# Patient Record
Sex: MALE | Race: WHITE | NOT HISPANIC OR LATINO | Employment: UNEMPLOYED | URBAN - METROPOLITAN AREA
[De-identification: names, ages, dates, MRNs, and addresses within clinical notes are randomized per-mention and may not be internally consistent; named-entity substitution may affect disease eponyms.]

---

## 2017-01-05 ENCOUNTER — HOSPITAL ENCOUNTER (EMERGENCY)
Facility: MEDICAL CENTER | Age: 27
End: 2017-01-06
Attending: EMERGENCY MEDICINE
Payer: COMMERCIAL

## 2017-01-05 VITALS
HEART RATE: 97 BPM | OXYGEN SATURATION: 98 % | DIASTOLIC BLOOD PRESSURE: 89 MMHG | SYSTOLIC BLOOD PRESSURE: 124 MMHG | TEMPERATURE: 97.2 F | WEIGHT: 175 LBS | RESPIRATION RATE: 16 BRPM | BODY MASS INDEX: 26.52 KG/M2 | HEIGHT: 68 IN

## 2017-01-05 DIAGNOSIS — F90.8 ATTENTION-DEFICIT HYPERACTIVITY DISORDER, OTHER TYPE: ICD-10-CM

## 2017-01-05 DIAGNOSIS — F43.21 SITUATIONAL DEPRESSION: ICD-10-CM

## 2017-01-05 LAB
AMPHET UR QL SCN: NEGATIVE
BARBITURATES UR QL SCN: NEGATIVE
BENZODIAZ UR QL SCN: NEGATIVE
BZE UR QL SCN: NEGATIVE
CANNABINOIDS UR QL SCN: POSITIVE
MDMA UR QL SCN: NEGATIVE
METHADONE UR QL SCN: NEGATIVE
OPIATES UR QL SCN: NEGATIVE
OXYCODONE UR QL SCN: NEGATIVE
PCP UR QL SCN: NEGATIVE
POC BREATHALIZER: 0 PERCENT (ref 0–0.01)
PROPOXYPH UR QL SCN: NEGATIVE

## 2017-01-05 PROCEDURE — 90791 PSYCH DIAGNOSTIC EVALUATION: CPT

## 2017-01-05 PROCEDURE — 302970 POC BREATHALIZER: Performed by: EMERGENCY MEDICINE

## 2017-01-05 PROCEDURE — 80307 DRUG TEST PRSMV CHEM ANLYZR: CPT

## 2017-01-05 PROCEDURE — 99285 EMERGENCY DEPT VISIT HI MDM: CPT

## 2017-01-05 RX ORDER — LAMOTRIGINE 100 MG/1
100 TABLET ORAL DAILY
Status: SHIPPED | COMMUNITY
End: 2022-02-20

## 2017-01-05 RX ORDER — DEXTROAMPHETAMINE SACCHARATE, AMPHETAMINE ASPARTATE MONOHYDRATE, DEXTROAMPHETAMINE SULFATE AND AMPHETAMINE SULFATE 2.5; 2.5; 2.5; 2.5 MG/1; MG/1; MG/1; MG/1
10 CAPSULE, EXTENDED RELEASE ORAL EVERY MORNING
Status: SHIPPED | COMMUNITY
End: 2022-02-20

## 2017-01-05 RX ORDER — PHENAZOPYRIDINE HYDROCHLORIDE 100 MG/1
100 TABLET, FILM COATED ORAL 3 TIMES DAILY PRN
Status: SHIPPED | COMMUNITY
End: 2022-02-20

## 2017-01-05 RX ORDER — QUETIAPINE FUMARATE 100 MG/1
100 TABLET, FILM COATED ORAL 2 TIMES DAILY
Status: SHIPPED | COMMUNITY
End: 2022-02-20

## 2017-01-05 ASSESSMENT — PAIN SCALES - GENERAL: PAINLEVEL_OUTOF10: 0

## 2017-01-05 NOTE — ED AVS SNAPSHOT
Nanigans Access Code: [email or web address]  Expires: 2/5/2017 12:13 AM    Your email address is not on file at [facility].  Email Addresses are required for you to sign up for Nanigans, please contact [phone] to verify your personal information and to provide your email address prior to attempting to register for Nanigans.    Bry Fuenteseto  [address] [place] [address]    Nanigans  A secure, online tool to manage your health information     [facility]’s Nanigans® is a secure, online tool that connects you to your personalized health information from the privacy of your home -- day or night - making it very easy for you to manage your healthcare. Once the activation process is completed, you can even access your medical information using the Nanigans edgardo, which is available for free in the Apple Edgardo store or Google Play store.     To learn more about Nanigans, visit [email or web address]    There are two levels of access available (as shown below):   My Chart Features  [facility] Primary Care Doctor [facility]  Specialists [facility]  Urgent  Care Non-[facility] Primary Care Doctor   Email your healthcare team securely and privately 24/7 X X X    Manage appointments: schedule your next appointment; view details of past/upcoming appointments X      Request prescription refills. X      View recent personal medical records, including lab and immunizations X X X X   View health record, including health history, allergies, medications X X X X   Read reports about your outpatient visits, procedures, consult and ER notes X X X X   See your discharge summary, which is a recap of your hospital and/or ER visit that includes your diagnosis, lab results, and care plan X X  X     How to register for [employer]:  Once your e-mail address has been verified, follow the following steps to sign up for Nanigans.     1. Go to  [email or web address]  2. Click on the Sign Up Now box, which takes you to the New Member Sign Up page. You will need  to provide the following information:  a. Enter your Coupons Near Me Access Code exactly as it appears at the top of this page. (You will not need to use this code after you’ve completed the sign-up process. If you do not sign up before the expiration date, you must request a new code.)   b. Enter your date of birth.   c. Enter your home email address.   d. Click Submit, and follow the next screen’s instructions.  3. Create a Coupons Near Me ID. This will be your Coupons Near Me login ID and cannot be changed, so think of one that is secure and easy to remember.  4. Create a Coupons Near Me password. You can change your password at any time.  5. Enter your Password Reset Question and Answer. This can be used at a later time if you forget your password.   6. Enter your e-mail address. This allows you to receive e-mail notifications when new information is available in Coupons Near Me.  7. Click Sign Up. You can now view your health information.    For assistance activating your Coupons Near Me account, call [phone]

## 2017-01-05 NOTE — ED AVS SNAPSHOT
After Visit Summary                                                                                                                Bry Knowles   MRN: [id number]    Department:  [facility], Emergency Dept   Date of Visit:  1/5/2017            [facility], Emergency Dept    [address]    [place] [address]    Phone:  [phone]      You were seen by     Agus Araya D.O.      Your Diagnosis Was     Situational depression     F43.21       Follow-up Information     1. Please follow up.    Why:  Follow up with your physician      Medication Information     Review all of your home medications and newly ordered medications with your primary doctor and/or pharmacist as soon as possible. Follow medication instructions as directed by your doctor and/or pharmacist.     Please keep your complete medication list with you and share with your physician. Update the information when medications are discontinued, doses are changed, or new medications (including over-the-counter products) are added; and carry medication information at all times in the event of emergency situations.               Medication List      ASK your doctor about these medications        Instructions    amphetamine-dextroamphetamine XR 10 MG Cp24   Commonly known as:  ADDERALL XR    Take 10 mg by mouth every morning.   Dose:  10 mg       lamotrigine 100 MG Tabs   Commonly known as:  LAMICTAL    Take 100 mg by mouth every day.   Dose:  100 mg       * phenazopyridine 100 MG Tabs   Commonly known as:  PYRIDIUM    Take 100 mg by mouth 3 times a day as needed.   Dose:  100 mg       * phenazopyridine 200 MG Tabs   Commonly known as:  PYRIDIUM    Take 1 Tab by mouth 3 times a day as needed for Mild Pain.   Dose:  200 mg       quetiapine 100 MG Tabs   Commonly known as:  SEROQUEL    Take 100 mg by mouth 2 times a day.   Dose:  100 mg       * Notice:  This list has 2 medication(s) that are the same as other  medications prescribed for you. Read the directions carefully, and ask your doctor or other care provider to review them with you.            Procedures and tests performed during your visit     POC BREATHALIZER    URINE DRUG SCREEN        Discharge Instructions       Adjustment Disorder  Adjustment disorder is an unusually severe reaction to a stressful life event, such as the loss of a job or physical illness. The event may be any stressful event other than the loss of a loved one. Adjustment disorder may affect your feelings, your thinking, how you act, or a combination of these. It may interfere with personal relationships or with the way you are at work, school, or home. People with this disorder are at risk for suicide and substance abuse. They may develop a more serious mental disorder, such as major depressive disorder or post-traumatic stress disorder.  SIGNS AND SYMPTOMS   Symptoms may include:  · Sadness, depressed mood, or crying spells.  · Loss of enjoyment.  · Change in appetite or weight.  · Sense of loss or hopelessness.  · Thoughts of suicide.  · Anxiety, worry, or nervousness.  · Trouble sleeping.  · Avoiding family and friends.  · Poor school performance.  · Fighting or vandalism.  · Reckless driving.  · Skipping school.  · Poor work performance.  · Ignoring bills.  Symptoms of adjustment disorder start within 3 months of the stressful life event. They do not last more than 6 months after the event has ended.  DIAGNOSIS   To make a diagnosis, your health care provider will ask about what has happened in your life and how it has affected you. He or she may also ask about your medical history and use of medicines, alcohol, and other substances. Your health care provider may do a physical exam and order lab tests or other studies. You may be referred to a mental health specialist for evaluation.  TREATMENT   Treatment options include:  · Counseling or talk therapy. Talk therapy is usually provided by  mental health specialists.  · Medicine. Certain medicines may help with depression, anxiety, and sleep.  · Support groups. Support groups offer emotional support, advice, and guidance. They are made up of people who have had similar experiences.  HOME CARE INSTRUCTIONS  · Keep all follow-up visits as directed by your health care provider. This is important.  · Take medicines only as directed by your health care provider.  SEEK MEDICAL CARE IF:   Your symptoms get worse.   SEEK IMMEDIATE MEDICAL CARE IF:  You have serious thoughts about hurting yourself or someone else.  MAKE SURE YOU:  · Understand these instructions.  · Will watch your condition.  · Will get help right away if you are not doing well or get worse.     This information is not intended to replace advice given to you by your health care provider. Make sure you discuss any questions you have with your health care provider.     Document Released: 08/22/2007 Document Revised: 01/08/2016 Document Reviewed: 05/12/2015  Qual[employer] Interactive Patient Education ©2016 [employer]            Patient Information     Patient Information    Following emergency treatment: all patient requiring follow-up care must return either to a private physician or a clinic if your condition worsens before you are able to obtain further medical attention, please return to the emergency room.     Billing Information    At [facility], we work to make the billing process streamlined for our patients.  Our Representatives are here to answer any questions you may have regarding your hospital bill.  If you have insurance coverage and have supplied your insurance information to us, we will submit a claim to your insurer on your behalf.  Should you have any questions regarding your bill, we can be reached online or by phone as follows:  Online: You are able pay your bills online or live chat with our representatives about any billing questions you may have. We are here to help  Monday - Friday from 8:00am to 7:30pm and 9:00am - 12:00pm on Saturdays.  Please visit [email or web address]  for more information.   Phone:  [phone] or [phone]    Please note that your emergency physician, surgeon, pathologist, radiologist, anesthesiologist, and other specialists are not employed by [facility] and will therefore bill separately for their services.  Please contact them directly for any questions concerning their bills at the numbers below:     Emergency Physician Services:  [phone]  [facility]:  [phone]  [facility]:  [phone]  [facility]:  [phone]    1. Your final bill may vary from the amount quoted upon discharge if all procedures are not complete at that time, or if your doctor has additional procedures of which we are not aware. You will receive an additional bill if you return to the Emergency Department at [facility] for suture removal regardless of the facility of which the sutures were placed.     2. Please arrange for settlement of this account at the emergency registration.    3. All self-pay accounts are due in full at the time of treatment.  If you are unable to meet this obligation then payment is expected within 4-5 days.     4. If you have had radiology studies (CT, X-ray, Ultrasound, MRI), you have received a preliminary result during your emergency department visit. Please contact the radiology department [phone] to receive a copy of your final result. Please discuss the Final result with your primary physician or with the follow up physician provided.     Crisis Hotline:  [place] Crisis Hotline:  7-955-RAVEAVG or 1-523.836.6006  [place] Crisis Hotline:    1-169.977.4013 or 515-229-0436         ED Discharge Follow Up Questions    1. In order to provide you with very good care, we would like to follow up with a phone call in the next few days.  May we have your  permission to contact you?     YES /  NO    2. What is the best phone number to call you? (       )_____-__________    3. What is the best time to call you?      Morning  /  Afternoon  /  Evening                   Patient Signature:  ____________________________________________________________    Date:  ____________________________________________________________

## 2017-01-05 NOTE — ED AVS SNAPSHOT
1/6/2017          Bry Knowles  [address] [place] [address]    Dear Bry:    [facility] wants to ensure your discharge home is safe and you or your loved ones have had all your questions answered regarding your care after you leave the hospital.    You may receive a telephone call within two days of your discharge.  This call is to make certain you understand your discharge instructions as well as ensure we provided you with the best care possible during your stay with us.     The call will only last approximately 3-5 minutes and will be done by a nurse.    Once again, we want to ensure your discharge home is safe and that you have a clear understanding of any next steps in your care.  If you have any questions or concerns, please do not hesitate to contact us, we are here for you.  Thank you for choosing [facility] for your healthcare needs.    Sincerely,    Christopher Ordoñez    [facility]

## 2017-01-06 NOTE — ED NOTES
Pt sitting up in bed, pt resp are even and unlabored, no distress noted, in direct view of PSA sitting outside of room

## 2017-01-06 NOTE — ED NOTES
Pt father called and asked to have pt psychiatrist information placed in chart, pt sees this psychiatrist [phone] Lin Churchill.  Pt father is Deyvi Knowles and can be reached at [phone] asked to be called if any change or emergency with pt, father only informed that pt was here and that he is stable by this nurse, informed that is all nurse can tell father due to HIPAA, father was understanding

## 2017-01-06 NOTE — ED NOTES
Pt sitting up in bed, given hospital gown to change into and all belongings placed into one blue person belongings bag, placed in secure holding area, sitter outside of room in direct observation of pt
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
## 2017-01-06 NOTE — ED NOTES
..Pt discharged in stable condition, ambulatory to waiting room in stable condition with all belongings  given written and verbal dc instructions no questions voiced

## 2017-01-06 NOTE — ED PROVIDER NOTES
ED Provider Note    CHIEF COMPLAINT  Chief Complaint   Patient presents with   • Suicidal Ideation     [place] pd called for pt breaking things in his home and having guns out, when police arrived pt father on phone told police that pt threatened suicide to him, pd has pt on legal hold for SI       HPI  Bry Knowles is a 26 y.o. male who presents to emergency room today brought in for possible suicidal ideation. Patient became upset when he found out that his father was having an affair. Apparently is very close to his family this upset him and he smashed a glass causing a cut to his left arm but denies being suicidal or homicidal. Patient is cooperative. Emergency room and maintains that he is not suicidal or homicidal throughout his stay.    REVIEW OF SYSTEMS  See HPI for further details. All other systems are negative.     PAST MEDICAL HISTORY  Past Medical History   Diagnosis Date   • Bipolar affective disorder (HCC)    • Asperger's disorder    • ADHD (attention deficit hyperactivity disorder)    • Depression    • Suicidal behavior        FAMILY HISTORY  History reviewed. No pertinent family history.    SOCIAL HISTORY  Social History     Social History   • Marital Status: Single     Spouse Name: N/A   • Number of Children: N/A   • Years of Education: N/A     Social History Main Topics   • Smoking status: Never Smoker    • Smokeless tobacco: None   • Alcohol Use: Yes      Comment: ocassional   • Drug Use: Yes     Special: Inhaled      Comment: thc   • Sexual Activity: Not Asked     Other Topics Concern   • None     Social History Narrative   • None       SURGICAL HISTORY  History reviewed. No pertinent past surgical history.    CURRENT MEDICATIONS  Home Medications     Reviewed by Felicia Nina R.N. (Registered Nurse) on 01/05/17 at 1947  Med List Status: Not Addressed    Medication Last Dose Status    amphetamine-dextroamphetamine XR (ADDERALL XR) 10 MG CAPSULE SR 24 HR  Active    lamotrigine (LAMICTAL)  "100 MG Tab  Active    phenazopyridine (PYRIDIUM) 100 MG Tab  Active    phenazopyridine (PYRIDIUM) 200 MG TABS  Active    quetiapine (SEROQUEL) 100 MG Tab  Active                ALLERGIES  No Known Allergies    PHYSICAL EXAM  VITAL SIGNS: /89 mmHg  Pulse 97  Temp(Src) 36.2 °C (97.2 °F)  Resp 16  Ht 1.727 m (5' 8\")  Wt 79.379 kg (175 lb)  BMI 26.61 kg/m2  SpO2 98% Room air O2: 96    Constitutional: Well developed, Well nourished, No acute distress, Non-toxic appearance.   HENT: Normocephalic, Atraumatic, Bilateral external ears normal, Oropharynx moist, No oral exudates, Nose normal.   Eyes: PERRLA, EOMI, Conjunctiva normal, No discharge.   Neck: Normal range of motion, No tenderness, Supple, No stridor.   Cardiovascular: Normal heart rate, Normal rhythm, No murmurs, No rubs, No gallops.   Thorax & Lungs: Normal breath sounds, No respiratory distress, No wheezing, No chest tenderness.  Abdomen: Bowel sounds normal, Soft, No tenderness, No masses, No pulsatile masses.    Skin: Warm, Dry, No erythema, No rash.   Extremities: Intact distal pulses, No edema, No tenderness, No cyanosis, No clubbing.   Neurologic: No neurological deficits  Psychiatric: No suicidal homicidal ideation        COURSE & MEDICAL DECISION MAKING  Pertinent Labs & Imaging studies reviewed. (See chart for details)  Patient seen by marco begin patient remains cooperative denies suicidal or homicidal ideation. Contracts for safety will follow up with outpatient counseling set up by marco continue his medications discharged in stable condition as above.    FINAL IMPRESSION  1. Situational depression  2. ADHD   3.      Electronically signed by: Agus Araya, 1/6/2017 12:26 AM    "

## 2017-01-06 NOTE — ED NOTES
..  Chief Complaint   Patient presents with   • Suicidal Ideation     [place] [facility] called for pt breaking things in his home and having guns out, when police arrived pt father on phone told police that pt threatened suicide to him, pd has pt on legal hold for SI     Pt has superficial cuts to bottom of feet from glass

## 2017-01-06 NOTE — DISCHARGE INSTRUCTIONS
Adjustment Disorder  Adjustment disorder is an unusually severe reaction to a stressful life event, such as the loss of a job or physical illness. The event may be any stressful event other than the loss of a loved one. Adjustment disorder may affect your feelings, your thinking, how you act, or a combination of these. It may interfere with personal relationships or with the way you are at work, school, or home. People with this disorder are at risk for suicide and substance abuse. They may develop a more serious mental disorder, such as major depressive disorder or post-traumatic stress disorder.  SIGNS AND SYMPTOMS   Symptoms may include:  · Sadness, depressed mood, or crying spells.  · Loss of enjoyment.  · Change in appetite or weight.  · Sense of loss or hopelessness.  · Thoughts of suicide.  · Anxiety, worry, or nervousness.  · Trouble sleeping.  · Avoiding family and friends.  · Poor school performance.  · Fighting or vandalism.  · Reckless driving.  · Skipping school.  · Poor work performance.  · Ignoring bills.  Symptoms of adjustment disorder start within 3 months of the stressful life event. They do not last more than 6 months after the event has ended.  DIAGNOSIS   To make a diagnosis, your health care provider will ask about what has happened in your life and how it has affected you. He or she may also ask about your medical history and use of medicines, alcohol, and other substances. Your health care provider may do a physical exam and order lab tests or other studies. You may be referred to a mental health specialist for evaluation.  TREATMENT   Treatment options include:  · Counseling or talk therapy. Talk therapy is usually provided by mental health specialists.  · Medicine. Certain medicines may help with depression, anxiety, and sleep.  · Support groups. Support groups offer emotional support, advice, and guidance. They are made up of people who have had similar experiences.  HOME CARE  INSTRUCTIONS  · Keep all follow-up visits as directed by your health care provider. This is important.  · Take medicines only as directed by your health care provider.  SEEK MEDICAL CARE IF:   Your symptoms get worse.   SEEK IMMEDIATE MEDICAL CARE IF:  You have serious thoughts about hurting yourself or someone else.  MAKE SURE YOU:  · Understand these instructions.  · Will watch your condition.  · Will get help right away if you are not doing well or get worse.     This information is not intended to replace advice given to you by your health care provider. Make sure you discuss any questions you have with your health care provider.     Document Released: 08/22/2007 Document Revised: 01/08/2016 Document Reviewed: 05/12/2015  [employer] Interactive Patient Education ©2016 [employer]

## 2021-03-18 ENCOUNTER — OFFICE VISIT (OUTPATIENT)
Dept: URGENT CARE | Facility: CLINIC | Age: 31
End: 2021-03-18

## 2021-03-18 ENCOUNTER — APPOINTMENT (OUTPATIENT)
Dept: RADIOLOGY | Facility: IMAGING CENTER | Age: 31
End: 2021-03-18
Attending: NURSE PRACTITIONER

## 2021-03-18 VITALS
TEMPERATURE: 97.5 F | HEIGHT: 68 IN | OXYGEN SATURATION: 95 % | SYSTOLIC BLOOD PRESSURE: 100 MMHG | WEIGHT: 143.2 LBS | RESPIRATION RATE: 16 BRPM | HEART RATE: 87 BPM | DIASTOLIC BLOOD PRESSURE: 76 MMHG | BODY MASS INDEX: 21.7 KG/M2

## 2021-03-18 DIAGNOSIS — R07.9 CHEST PAIN, UNSPECIFIED TYPE: ICD-10-CM

## 2021-03-18 DIAGNOSIS — R06.02 SHORTNESS OF BREATH: ICD-10-CM

## 2021-03-18 PROCEDURE — 71046 X-RAY EXAM CHEST 2 VIEWS: CPT | Mod: TC | Performed by: NURSE PRACTITIONER

## 2021-03-18 PROCEDURE — 93000 ELECTROCARDIOGRAM COMPLETE: CPT | Performed by: NURSE PRACTITIONER

## 2021-03-18 PROCEDURE — 99204 OFFICE O/P NEW MOD 45 MIN: CPT | Performed by: NURSE PRACTITIONER

## 2021-03-18 RX ORDER — ALBUTEROL SULFATE 90 UG/1
2 AEROSOL, METERED RESPIRATORY (INHALATION) EVERY 6 HOURS PRN
Qty: 8.5 G | Refills: 0 | Status: SHIPPED | OUTPATIENT
Start: 2021-03-18 | End: 2022-02-20

## 2021-03-18 ASSESSMENT — ENCOUNTER SYMPTOMS: SHORTNESS OF BREATH: 1

## 2021-03-19 ASSESSMENT — ENCOUNTER SYMPTOMS
CHILLS: 0
NAUSEA: 0
MYALGIAS: 0
SPUTUM PRODUCTION: 0
SORE THROAT: 0
COUGH: 0
FEVER: 0
DIZZINESS: 0
VOMITING: 0
EYE REDNESS: 0

## 2021-03-19 NOTE — PROGRESS NOTES
Subjective:   Bry Knowles is a 31 y.o. male who presents for Shortness of Breath (swollen lymph node, chest pain, x2 days )      Shortness of Breath  This is a new problem. Episode onset: 2 days; notes mild chest pain which he associates with smoking excessive amount of marijuana.  Patient has no cardiac history. The problem occurs constantly. The problem has been unchanged. Associated symptoms include chest pain (mild). Pertinent negatives include no fever, rash, sore throat, sputum production or vomiting. Nothing aggravates the symptoms. He has tried nothing for the symptoms. The treatment provided no relief. There is no history of allergies, asthma, chronic lung disease, PE or pneumonia.       Review of Systems   Constitutional: Negative for chills, fever and malaise/fatigue.   HENT: Negative for congestion and sore throat.    Eyes: Negative for redness.   Respiratory: Positive for shortness of breath. Negative for cough and sputum production.    Cardiovascular: Positive for chest pain (mild).   Gastrointestinal: Negative for nausea and vomiting.   Genitourinary: Negative for dysuria.   Musculoskeletal: Negative for myalgias.   Skin: Negative for rash.   Neurological: Negative for dizziness.       Medications:    • amphetamine-dextroamphetamine XR Cp24  • lamoTRIgine Tabs  • phenazopyridine Tabs  • QUEtiapine Tabs    Allergies: Patient has no known allergies.    Problem List: Bry Knowles does not have a problem list on file.    Surgical History:  No past surgical history on file.    Past Social Hx: Bry Knowles  reports that he has never smoked. He does not have any smokeless tobacco history on file. He reports current alcohol use. He reports current drug use. Drug: Inhaled.     Past Family Hx:  Bry Knowles family history is not on file.     Problem list, medications, and allergies reviewed by myself today in Epic.     Objective:     /76 (BP Location: Left arm, Patient Position: Sitting, BP  "Cuff Size: Adult)   Pulse 87   Temp 36.4 °C (97.5 °F) (Temporal)   Resp 16   Ht 1.727 m (5' 8\")   Wt 65 kg (143 lb 3.2 oz)   SpO2 95%   BMI 21.77 kg/m²     Physical Exam  Vitals and nursing note reviewed.   Constitutional:       General: He is not in acute distress.     Appearance: He is well-developed.   HENT:      Head: Normocephalic and atraumatic.      Right Ear: Tympanic membrane and external ear normal.      Left Ear: Tympanic membrane and external ear normal.      Nose: Nose normal.      Right Sinus: No maxillary sinus tenderness or frontal sinus tenderness.      Left Sinus: No maxillary sinus tenderness or frontal sinus tenderness.      Mouth/Throat:      Mouth: Mucous membranes are moist.      Pharynx: Uvula midline. No posterior oropharyngeal erythema.      Tonsils: No tonsillar exudate or tonsillar abscesses.   Eyes:      General:         Right eye: No discharge.         Left eye: No discharge.      Conjunctiva/sclera: Conjunctivae normal.   Cardiovascular:      Rate and Rhythm: Normal rate.      Pulses: Normal pulses.      Heart sounds: Normal heart sounds, S1 normal and S2 normal.   Pulmonary:      Effort: Pulmonary effort is normal. No respiratory distress.      Breath sounds: Normal breath sounds.   Abdominal:      General: There is no distension.   Musculoskeletal:         General: Normal range of motion.   Lymphadenopathy:      Cervical: No cervical adenopathy.   Skin:     General: Skin is warm and dry.   Neurological:      General: No focal deficit present.      Mental Status: He is alert and oriented to person, place, and time. Mental status is at baseline.      Gait: Gait (gait at baseline) normal.   Psychiatric:         Judgment: Judgment normal.         Assessment/Plan:     Diagnosis and associated orders:     1. Chest pain, unspecified type  DX-CHEST-2 VIEWS    EKG - Clinic Performed    REFERRAL TO CARDIOLOGY   2. Shortness of breath  albuterol 108 (90 Base) MCG/ACT Aero Soln inhalation " aerosol    REFERRAL TO CARDIOLOGY        Comments/MDM:     EKG interpreted by provider    Rate: Normal  Rhythm: Regular Sinus   QRS Complex: Narrow   Axis: Normal  Interval/Conduction: Normal  Enlargement: None    Ischemia:      ST Segments: no acute change      T Waves: no acute change      Q Waves: none    Comparison: NONE    • Clinical Impression: no acute changes and normal EKG, see scanned sheet    • I independently reviewed the patient's imaging and agree with the interpretation of the radiologist.    1.  No acute cardiopulmonary disease.    Patient is a well-appearing 31-year-old male who present with a stated above, overall physical exam fairly benign, vital signs stable, no red flags were appreciated.  The patient has atypical chest pain as the patient's chest pain is not suggestive of pulmonary embolus, cardiac ischemia, aortic dissection, or other serious etiology. Given the extremely low risk of these diagnoses further testing and evaluation for these possibilities does not appear to be indicated at this time as we are limited in clinical location.,  I will place urgent referral for cardiology follow-up for further evaluation and management.  Will trial albuterol inhaler.  Did recommend abstaining from smoking at this time.  Differential diagnosis, natural history, supportive care, and indications for immediate follow-up discussed.          Please note that this dictation was created using voice recognition software. I have made a reasonable attempt to correct obvious errors, but I expect that there are errors of grammar and possibly content that I did not discover before finalizing the note.    This note was electronically signed by Ashvin LLANES.

## 2021-04-14 ENCOUNTER — TELEPHONE (OUTPATIENT)
Dept: CARDIOLOGY | Facility: MEDICAL CENTER | Age: 31
End: 2021-04-14

## 2021-04-14 NOTE — TELEPHONE ENCOUNTER
Spoke to patient in regards to records for NP appointment with Dr. Nam on 4/27/2021 at 09:45am. Patient states that he wants his appointment cancelled due to moving his care to [facility] because his insurance is not covered here at [facility]. Appointment has been cancelled.

## 2022-02-20 ENCOUNTER — OFFICE VISIT (OUTPATIENT)
Dept: URGENT CARE | Facility: CLINIC | Age: 32
End: 2022-02-20
Payer: COMMERCIAL

## 2022-02-20 VITALS
SYSTOLIC BLOOD PRESSURE: 98 MMHG | OXYGEN SATURATION: 96 % | HEART RATE: 81 BPM | HEIGHT: 69 IN | TEMPERATURE: 98.1 F | RESPIRATION RATE: 16 BRPM | DIASTOLIC BLOOD PRESSURE: 60 MMHG | WEIGHT: 145.8 LBS | BODY MASS INDEX: 21.59 KG/M2

## 2022-02-20 DIAGNOSIS — K21.9 GASTROESOPHAGEAL REFLUX DISEASE, UNSPECIFIED WHETHER ESOPHAGITIS PRESENT: ICD-10-CM

## 2022-02-20 PROCEDURE — 99213 OFFICE O/P EST LOW 20 MIN: CPT | Performed by: FAMILY MEDICINE

## 2022-02-20 RX ORDER — FAMOTIDINE 20 MG/1
20 TABLET, FILM COATED ORAL 2 TIMES DAILY
Qty: 60 TABLET | Refills: 0 | Status: SHIPPED | OUTPATIENT
Start: 2022-02-20 | End: 2023-02-10

## 2022-02-20 ASSESSMENT — ENCOUNTER SYMPTOMS
FEVER: 0
VOMITING: 0

## 2022-02-20 NOTE — PROGRESS NOTES
"Subjective:     Bry Knowles is a 31 y.o. male who presents for Chest Pain (Quit smoking weed last year and then switched to edibles.  He had chest pains.  The patient thinks he might have acid reflux.  He struggled with bulimia this past year.  He's uncertain what is truly causing his \"chest pain\". )    HPI  Pt presents for evaluation of intermittent chest discomfort   Has had this on and off for a few weeks   Pain comes and goes  Symptoms are better when he is up moving around   Has worsened symptoms when sleeping at night or when laying down   Trying to sleep elevated which helps     Hx of bulmia, however has not been actively vomiting recently   Has been a few weeks since last emesis     Review of Systems   Constitutional: Negative for fever.   Gastrointestinal: Negative for vomiting.   Skin: Negative for rash.     PMH:  has a past medical history of ADHD (attention deficit hyperactivity disorder), Asperger's disorder, Bipolar affective disorder (HCC), Depression, and Suicidal behavior.  MEDS: No current outpatient medications on file.  ALLERGIES: No Known Allergies  SURGHX: History reviewed. No pertinent surgical history.  SOCHX:  reports that he has never smoked. He has never used smokeless tobacco. He reports current alcohol use. He reports current drug use. Drug: Marijuana.     Objective:   BP (!) 98/60 (BP Location: Left arm, Patient Position: Sitting, BP Cuff Size: Adult)   Pulse 81   Temp 36.7 °C (98.1 °F) (Temporal)   Resp 16   Ht 1.753 m (5' 9\")   Wt 66.1 kg (145 lb 12.8 oz)   SpO2 96%   BMI 21.53 kg/m²     Physical Exam  Constitutional:       General: He is not in acute distress.     Appearance: He is well-developed. He is not diaphoretic.   HENT:      Head: Normocephalic and atraumatic.   Neck:      Trachea: No tracheal deviation.   Cardiovascular:      Rate and Rhythm: Normal rate and regular rhythm.      Heart sounds: Normal heart sounds.   Pulmonary:      Effort: Pulmonary effort is " normal. No respiratory distress.      Breath sounds: Normal breath sounds. No wheezing or rales.      Comments: No tenderness throughout chest wall  Abdominal:      Palpations: Abdomen is soft.   Musculoskeletal:         General: Normal range of motion.   Skin:     General: Skin is warm and dry.      Findings: No rash.   Neurological:      Mental Status: He is alert.   Psychiatric:         Behavior: Behavior normal.         Thought Content: Thought content normal.         Judgment: Judgment normal.         Assessment/Plan:   Assessment    1. Gastroesophageal reflux disease, unspecified whether esophagitis present  - famotidine (PEPCID) 20 MG Tab; Take 1 Tablet by mouth 2 times a day.  Dispense: 60 Tablet; Refill: 0    Patient with GERD.  Will treat with famotidine.  Reviewed other supportive care measures and follow-up precautions.  Follow-up as needed.

## 2023-02-10 ENCOUNTER — OFFICE VISIT (OUTPATIENT)
Dept: URGENT CARE | Facility: CLINIC | Age: 33
End: 2023-02-10
Payer: COMMERCIAL

## 2023-02-10 ENCOUNTER — APPOINTMENT (OUTPATIENT)
Dept: RADIOLOGY | Facility: IMAGING CENTER | Age: 33
End: 2023-02-10
Attending: FAMILY MEDICINE
Payer: COMMERCIAL

## 2023-02-10 VITALS
OXYGEN SATURATION: 99 % | DIASTOLIC BLOOD PRESSURE: 58 MMHG | HEART RATE: 79 BPM | SYSTOLIC BLOOD PRESSURE: 100 MMHG | BODY MASS INDEX: 22.57 KG/M2 | TEMPERATURE: 97.9 F | HEIGHT: 68 IN | WEIGHT: 148.9 LBS | RESPIRATION RATE: 12 BRPM

## 2023-02-10 DIAGNOSIS — S93.602A SPRAIN OF LEFT FOOT, INITIAL ENCOUNTER: ICD-10-CM

## 2023-02-10 DIAGNOSIS — S92.902A CLOSED FRACTURE OF LEFT FOOT, INITIAL ENCOUNTER: Primary | ICD-10-CM

## 2023-02-10 PROCEDURE — 99213 OFFICE O/P EST LOW 20 MIN: CPT | Performed by: FAMILY MEDICINE

## 2023-02-10 PROCEDURE — 73630 X-RAY EXAM OF FOOT: CPT | Mod: TC,LT | Performed by: FAMILY MEDICINE

## 2023-02-10 NOTE — PROGRESS NOTES
"Subjective     Bry Knowles is a 32 y.o. male who presents with Foot Injury (Pt has pain on (L) foot, hurts to walk, swelling x last night)      - This is a pleasant and nontoxic appearing 32 y.o. male who has come to the walk-in clinic today for:    #1) running yesterday and twisted/sprained Lt foot. Has pain w/ walking and mild pain at rest       ALLERGIES:  Patient has no known allergies.     PMH:  Past Medical History:   Diagnosis Date    ADHD (attention deficit hyperactivity disorder)     Asperger's disorder     Bipolar affective disorder (HCC)     Depression     Suicidal behavior         PSH:  History reviewed. No pertinent surgical history.    MEDS:  No current outpatient medications on file.    ** I have documented what I find to be significant in regards to past medical, social, family and surgical history  in my HPI or under PMH/PSH/FH review section, otherwise it is noncontributory **         HPI    Review of Systems   Musculoskeletal:  Positive for joint pain.   All other systems reviewed and are negative.           Objective     /58 (BP Location: Left arm, Patient Position: Sitting, BP Cuff Size: Adult)   Pulse 79   Temp 36.6 °C (97.9 °F) (Temporal)   Resp 12   Ht 1.727 m (5' 8\")   Wt 67.5 kg (148 lb 14.4 oz)   SpO2 99%   BMI 22.64 kg/m²      Physical Exam  Vitals and nursing note reviewed.   Constitutional:       General: He is not in acute distress.     Appearance: Normal appearance. He is well-developed.   HENT:      Head: Normocephalic.   Pulmonary:      Effort: Pulmonary effort is normal. No respiratory distress.   Musculoskeletal:        Feet:    Feet:      Comments: Lt foot: + edema and bruising and ttp. Good srom ankle, NVI  Neurological:      Mental Status: He is alert.      Motor: No abnormal muscle tone.   Psychiatric:         Mood and Affect: Mood normal.         Behavior: Behavior normal.                           Assessment & Plan       1. Closed fracture of left foot, " initial encounter  Referral to Orthopedics      2. Sprain of left foot, initial encounter  DX-FOOT-COMPLETE 3+ LEFT    Referral to Orthopedics              - Dx, plan & d/c instructions discussed   - RICE  - OTC Motrin and/or Tylenol as needed  - Tall walker Boot and crutches, TTWB  - E.R. precautions discussed     Follow up with Ortho, [facility] office for a recheck on today's visit within next 5 days. ER if not improving in 2-3 days or if feeling/getting worse. (If you do not have a primary care provider and need to schedule one you may call [facility] at [phone] to do this).    Any realistic side effects of medications that may have been given today reviewed.     Patient left in stable condition     Today's radiology imaging personally reviewed by me today on day of visit and Radiology readings reviewed and discussed w/ patient today.

## 2024-12-20 NOTE — ED NOTES
Life skills nurse at bedside   What Type Of Note Output Would You Prefer (Optional)?: Standard Output How Severe Is Your Skin Lesion?: mild Has Your Skin Lesion Been Treated?: not been treated Is This A New Presentation, Or A Follow-Up?: Skin Lesions